# Patient Record
Sex: MALE | Race: WHITE | NOT HISPANIC OR LATINO | URBAN - METROPOLITAN AREA
[De-identification: names, ages, dates, MRNs, and addresses within clinical notes are randomized per-mention and may not be internally consistent; named-entity substitution may affect disease eponyms.]

---

## 2023-05-30 LAB
ALANINE AMINOTRANSFERASE (SGPT) (U/L) IN SER/PLAS: 28 U/L (ref 10–52)
ALBUMIN (G/DL) IN SER/PLAS: 4.4 G/DL (ref 3.4–5)
ALKALINE PHOSPHATASE (U/L) IN SER/PLAS: 53 U/L (ref 33–136)
ANION GAP IN SER/PLAS: 13 MMOL/L (ref 10–20)
ASPARTATE AMINOTRANSFERASE (SGOT) (U/L) IN SER/PLAS: 21 U/L (ref 9–39)
BILIRUBIN TOTAL (MG/DL) IN SER/PLAS: 1.7 MG/DL (ref 0–1.2)
CALCIUM (MG/DL) IN SER/PLAS: 9.6 MG/DL (ref 8.6–10.3)
CARBON DIOXIDE, TOTAL (MMOL/L) IN SER/PLAS: 29 MMOL/L (ref 21–32)
CHLORIDE (MMOL/L) IN SER/PLAS: 102 MMOL/L (ref 98–107)
CREATININE (MG/DL) IN SER/PLAS: 1.09 MG/DL (ref 0.5–1.3)
ERYTHROCYTE DISTRIBUTION WIDTH (RATIO) BY AUTOMATED COUNT: 12.6 % (ref 11.5–14.5)
ERYTHROCYTE MEAN CORPUSCULAR HEMOGLOBIN CONCENTRATION (G/DL) BY AUTOMATED: 33.2 G/DL (ref 32–36)
ERYTHROCYTE MEAN CORPUSCULAR VOLUME (FL) BY AUTOMATED COUNT: 86 FL (ref 80–100)
ERYTHROCYTES (10*6/UL) IN BLOOD BY AUTOMATED COUNT: 4.77 X10E12/L (ref 4.5–5.9)
GFR MALE: 77 ML/MIN/1.73M2
GLUCOSE (MG/DL) IN SER/PLAS: 86 MG/DL (ref 74–99)
HEMATOCRIT (%) IN BLOOD BY AUTOMATED COUNT: 41 % (ref 41–52)
HEMOGLOBIN (G/DL) IN BLOOD: 13.6 G/DL (ref 13.5–17.5)
LEUKOCYTES (10*3/UL) IN BLOOD BY AUTOMATED COUNT: 7 X10E9/L (ref 4.4–11.3)
PLATELETS (10*3/UL) IN BLOOD AUTOMATED COUNT: 278 X10E9/L (ref 150–450)
POTASSIUM (MMOL/L) IN SER/PLAS: 4.7 MMOL/L (ref 3.5–5.3)
PROTEIN TOTAL: 7.1 G/DL (ref 6.4–8.2)
SODIUM (MMOL/L) IN SER/PLAS: 139 MMOL/L (ref 136–145)
THYROTROPIN (MIU/L) IN SER/PLAS BY DETECTION LIMIT <= 0.05 MIU/L: 1.2 MIU/L (ref 0.44–3.98)
UREA NITROGEN (MG/DL) IN SER/PLAS: 21 MG/DL (ref 6–23)

## 2023-10-30 PROBLEM — R79.89 ELEVATED HOMOCYSTEINE: Status: ACTIVE | Noted: 2019-05-10

## 2023-10-30 PROBLEM — R26.89 BALANCE PROBLEM: Status: ACTIVE | Noted: 2023-10-30

## 2023-10-30 PROBLEM — E78.5 HYPERLIPIDEMIA: Status: ACTIVE | Noted: 2023-10-30

## 2023-10-30 PROBLEM — H93.A2 SUBJECTIVE PULSATILE TINNITUS OF LEFT EAR: Status: ACTIVE | Noted: 2023-10-30

## 2023-10-30 PROBLEM — G43.009 MIGRAINE WITHOUT AURA AND WITHOUT STATUS MIGRAINOSUS, NOT INTRACTABLE: Status: ACTIVE | Noted: 2021-06-25

## 2023-10-30 PROBLEM — N40.0 BPH (BENIGN PROSTATIC HYPERPLASIA): Status: ACTIVE | Noted: 2018-11-20

## 2023-10-30 PROBLEM — R73.01 IFG (IMPAIRED FASTING GLUCOSE): Status: ACTIVE | Noted: 2020-09-29

## 2023-10-30 PROBLEM — R31.29 MICROHEMATURIA: Status: ACTIVE | Noted: 2018-10-12

## 2023-10-30 PROBLEM — H93.19 TINNITUS: Status: ACTIVE | Noted: 2023-10-30

## 2023-10-30 PROBLEM — K21.9 GASTROESOPHAGEAL REFLUX DISEASE: Status: ACTIVE | Noted: 2023-10-12

## 2023-10-30 PROBLEM — R97.20 ELEVATED PSA: Status: ACTIVE | Noted: 2022-10-12

## 2023-10-30 PROBLEM — I77.72 ILIAC DISSECTION (MULTI): Status: ACTIVE | Noted: 2023-10-30

## 2023-10-30 PROBLEM — I72.0 CAROTID PSEUDOANEURYSM (CMS-HCC): Status: ACTIVE | Noted: 2023-10-30

## 2023-10-30 RX ORDER — ROSUVASTATIN CALCIUM 40 MG/1
40 TABLET, COATED ORAL NIGHTLY
COMMUNITY
Start: 2022-10-12

## 2023-10-30 RX ORDER — METOPROLOL SUCCINATE 50 MG/1
50 TABLET, EXTENDED RELEASE ORAL DAILY
COMMUNITY
Start: 2022-12-02

## 2023-10-30 RX ORDER — PRAVASTATIN SODIUM 40 MG/1
40 TABLET ORAL NIGHTLY
COMMUNITY
Start: 2012-01-23 | End: 2023-10-31 | Stop reason: ALTCHOICE

## 2023-10-30 RX ORDER — ALPRAZOLAM 0.25 MG/1
0.25 TABLET ORAL NIGHTLY PRN
COMMUNITY
Start: 2014-11-12 | End: 2023-10-31 | Stop reason: SDUPTHER

## 2023-10-30 RX ORDER — LISINOPRIL 20 MG/1
20 TABLET ORAL DAILY
COMMUNITY
Start: 2016-08-31 | End: 2023-10-31 | Stop reason: SDUPTHER

## 2023-10-30 RX ORDER — FLUOXETINE HYDROCHLORIDE 60 MG/1
60 TABLET, FILM COATED ORAL; ORAL
COMMUNITY
Start: 2020-02-17 | End: 2023-10-31 | Stop reason: ALTCHOICE

## 2023-10-30 RX ORDER — HYDROCHLOROTHIAZIDE 25 MG/1
25 TABLET ORAL DAILY
COMMUNITY
Start: 2013-04-17 | End: 2023-10-31 | Stop reason: ALTCHOICE

## 2023-10-30 RX ORDER — FOLIC ACID 1 MG/1
1 TABLET ORAL NIGHTLY
COMMUNITY
Start: 2023-02-23

## 2023-10-30 RX ORDER — ROSUVASTATIN CALCIUM 20 MG/1
20 TABLET, COATED ORAL
COMMUNITY
Start: 2020-10-02 | End: 2023-10-31 | Stop reason: ALTCHOICE

## 2023-10-30 RX ORDER — HYDROCHLOROTHIAZIDE 12.5 MG/1
12.5 TABLET ORAL DAILY
COMMUNITY
Start: 2012-01-23 | End: 2023-10-31 | Stop reason: ALTCHOICE

## 2023-10-30 RX ORDER — WARFARIN SODIUM 5 MG/1
5 TABLET ORAL
COMMUNITY
Start: 2012-01-23 | End: 2023-10-31 | Stop reason: ALTCHOICE

## 2023-10-30 RX ORDER — NAPROXEN SODIUM 220 MG/1
81 TABLET, FILM COATED ORAL DAILY
COMMUNITY
Start: 2012-01-23 | End: 2023-10-31 | Stop reason: ALTCHOICE

## 2023-10-30 RX ORDER — LANOLIN ALCOHOL/MO/W.PET/CERES
1000 CREAM (GRAM) TOPICAL DAILY
COMMUNITY
Start: 2022-09-29

## 2023-10-30 RX ORDER — PANTOPRAZOLE SODIUM 40 MG/1
1 TABLET, DELAYED RELEASE ORAL
COMMUNITY
Start: 2023-10-12

## 2023-10-30 RX ORDER — ALPRAZOLAM 0.5 MG/1
1 TABLET ORAL NIGHTLY
COMMUNITY
Start: 2020-02-17 | End: 2023-10-31 | Stop reason: ALTCHOICE

## 2023-10-30 RX ORDER — CLOPIDOGREL BISULFATE 75 MG/1
1 TABLET ORAL DAILY
COMMUNITY
Start: 2020-02-17

## 2023-10-30 RX ORDER — CHLORTHALIDONE 25 MG/1
1 TABLET ORAL DAILY
COMMUNITY
Start: 2020-02-17

## 2023-10-30 RX ORDER — AMLODIPINE BESYLATE 5 MG/1
5 TABLET ORAL
COMMUNITY
Start: 2022-03-28 | End: 2023-10-31 | Stop reason: SDUPTHER

## 2023-10-30 RX ORDER — LISINOPRIL 40 MG/1
1 TABLET ORAL NIGHTLY
COMMUNITY
Start: 2023-03-23

## 2023-10-30 RX ORDER — CETIRIZINE HYDROCHLORIDE 10 MG/1
1 TABLET ORAL DAILY
COMMUNITY
Start: 2020-02-17

## 2023-10-30 RX ORDER — SPIRONOLACTONE 25 MG/1
25 TABLET ORAL DAILY
COMMUNITY
Start: 2023-10-12

## 2023-10-30 RX ORDER — METOPROLOL SUCCINATE 25 MG/1
1 TABLET, EXTENDED RELEASE ORAL DAILY
COMMUNITY
Start: 2020-02-17 | End: 2023-10-31 | Stop reason: SDUPTHER

## 2023-10-30 RX ORDER — ALPHA LIPOIC ACID 200 MG
CAPSULE ORAL
COMMUNITY
Start: 2020-02-17 | End: 2023-10-31 | Stop reason: ALTCHOICE

## 2023-10-30 RX ORDER — FLUOXETINE HYDROCHLORIDE 20 MG/1
80 CAPSULE ORAL NIGHTLY
COMMUNITY
Start: 2022-09-29

## 2023-10-30 RX ORDER — CETIRIZINE HYDROCHLORIDE 5 MG/1
5 TABLET ORAL NIGHTLY
COMMUNITY
Start: 2022-09-29 | End: 2023-10-31 | Stop reason: SDUPTHER

## 2023-10-30 RX ORDER — ROSUVASTATIN CALCIUM 10 MG/1
1 TABLET, COATED ORAL NIGHTLY
COMMUNITY
Start: 2012-10-31 | End: 2023-10-31 | Stop reason: ALTCHOICE

## 2023-10-30 RX ORDER — AMLODIPINE BESYLATE 10 MG/1
10 TABLET ORAL DAILY
COMMUNITY
Start: 2022-12-02

## 2023-10-31 ENCOUNTER — HOSPITAL ENCOUNTER (OUTPATIENT)
Dept: VASCULAR MEDICINE | Facility: HOSPITAL | Age: 63
Discharge: HOME | End: 2023-10-31
Payer: COMMERCIAL

## 2023-10-31 ENCOUNTER — HOSPITAL ENCOUNTER (OUTPATIENT)
Dept: RADIOLOGY | Facility: HOSPITAL | Age: 63
Discharge: HOME | End: 2023-10-31
Payer: COMMERCIAL

## 2023-10-31 ENCOUNTER — OFFICE VISIT (OUTPATIENT)
Dept: CARDIOLOGY | Facility: HOSPITAL | Age: 63
End: 2023-10-31
Payer: COMMERCIAL

## 2023-10-31 VITALS
WEIGHT: 182.5 LBS | BODY MASS INDEX: 27.66 KG/M2 | HEART RATE: 58 BPM | SYSTOLIC BLOOD PRESSURE: 133 MMHG | HEIGHT: 68 IN | DIASTOLIC BLOOD PRESSURE: 84 MMHG | OXYGEN SATURATION: 96 %

## 2023-10-31 DIAGNOSIS — I77.72: ICD-10-CM

## 2023-10-31 DIAGNOSIS — Z86.79 PERSONAL HISTORY OF OTHER DISEASES OF THE CIRCULATORY SYSTEM: ICD-10-CM

## 2023-10-31 DIAGNOSIS — I72.0 ANEURYSM OF CAROTID ARTERY (CMS-HCC): ICD-10-CM

## 2023-10-31 DIAGNOSIS — I77.71 CAROTID DISSECTION, BILATERAL (MULTI): ICD-10-CM

## 2023-10-31 DIAGNOSIS — I72.0 CAROTID PSEUDOANEURYSM (CMS-HCC): ICD-10-CM

## 2023-10-31 DIAGNOSIS — E78.2 MIXED HYPERLIPIDEMIA: ICD-10-CM

## 2023-10-31 PROCEDURE — 99214 OFFICE O/P EST MOD 30 MIN: CPT | Performed by: INTERNAL MEDICINE

## 2023-10-31 PROCEDURE — 1036F TOBACCO NON-USER: CPT | Performed by: INTERNAL MEDICINE

## 2023-10-31 PROCEDURE — 70547 MR ANGIOGRAPHY NECK W/O DYE: CPT

## 2023-10-31 PROCEDURE — 3079F DIAST BP 80-89 MM HG: CPT | Performed by: INTERNAL MEDICINE

## 2023-10-31 PROCEDURE — 70544 MR ANGIOGRAPHY HEAD W/O DYE: CPT

## 2023-10-31 PROCEDURE — 70547 MR ANGIOGRAPHY NECK W/O DYE: CPT | Performed by: RADIOLOGY

## 2023-10-31 PROCEDURE — 93978 VASCULAR STUDY: CPT | Performed by: INTERNAL MEDICINE

## 2023-10-31 PROCEDURE — 70544 MR ANGIOGRAPHY HEAD W/O DYE: CPT | Performed by: RADIOLOGY

## 2023-10-31 PROCEDURE — 3074F SYST BP LT 130 MM HG: CPT | Performed by: INTERNAL MEDICINE

## 2023-10-31 PROCEDURE — 93978 VASCULAR STUDY: CPT

## 2023-10-31 NOTE — PATIENT INSTRUCTIONS
Good to see you today Mr. Sargent.  Continue medications as prescribed.  We will reach out with final report of today's testing.    See you back in 2 years if testing is stable.     Very truly yours,    Britany Harding MD  Co-Director, Vascular Center  [facility], [facility]   Ramon Hicks Master Clinician in Fibromuscular Dysplasia and Vascular Care  Professor of Medicine  [facility]

## 2023-10-31 NOTE — PROGRESS NOTES
"10/31/23    FMD/Arterial Dissection Program    Chief Complaint:   No chief complaint on file.     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 He is maintained on clopidogrel long-term. He also has hypertension and hyperlipidemia. 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 He had imaging of his aorta and visceral branches which was normal in ~ 2013 but he was found to have a \"new\" right EIA on brain to pelvis imaging in 9.2021 of uncertain age. He has a family history of Nagel syndrome. We did not do other genetic testing given no other vascular abnormalities (i.e., no aneurysms and thought low yield). In 2.2020, he had a new event with pulsatile tinnitus and scalp pain -- found to have new left ICA dissection with PSA. He saw Dr. Tim; we conservatively managed and this was improved on subsequent imaging.     Since I saw him last in May no known vascular events. He has stable pulsatile tinnitus and minimal headaches. He does have a mild headache today.  He is tolerating clopidogrel without bleeding. BP better with Dr. David's adjustments.    Exertional dyspnea with stair climbing and fatigue are better, though he sees his cardiologist soon.    Past Medical History:  He has a past medical history of Carotid pseudoaneurysm (CMS/HCC), Fatigue, History of carotid artery dissection, Iliac dissection (CMS/HCC), Mixed hyperlipidemia, Personal history of other diseases of the circulatory system, Personal history of other diseases of the circulatory system (09/01/2022), Personal history of other mental and behavioral disorders, and Subjective pulsatile tinnitus.    Past Surgical History:  He has a past surgical history that includes Other surgical history (02/17/2020); Other surgical history (02/17/2020); CT angio head w and wo IV contrast " "(02/17/2020); CT angio neck w and wo IV contrast (02/17/2020); CT angio neck w and wo IV contrast (06/08/2020); CT angio head w and wo IV contrast (06/08/2020); MR angio head wo IV contrast (12/14/2020); MR angio neck w and wo IV contrast (12/14/2020); MR angio head wo IV contrast (10/15/2022); CT angio head w and wo IV contrast (06/24/2021); CT angio neck w and wo IV contrast (06/24/2021); Cholecystectomy; and Hernia repair.      Social History:  He reports that he has never smoked. He has never used smokeless tobacco. No history on file for alcohol use and drug use.    Family History:  Family History   Problem Relation Name Age of Onset    Hypertension Mother      Hypertension Father      Nagel syndrome Other          Allergies:  Fish oil    Outpatient Medications:  Current Outpatient Medications   Medication Instructions    amLODIPine (NORVASC) 10 mg, oral, Daily    cetirizine (ZyrTEC) 10 mg tablet 1 tablet, oral, Daily    chlorthalidone (Hygroton) 25 mg tablet 1 tablet, oral, Daily    clopidogrel (Plavix) 75 mg tablet 1 tablet, oral, Daily    cyanocobalamin (VITAMIN B-12) 1,000 mcg, oral, Daily    FLUoxetine (PROZAC) 80 mg, oral, Nightly    folic acid (FOLVITE) 1 mg, oral, Nightly    lisinopril 40 mg tablet 1 tablet, oral, Nightly    metoprolol succinate XL (TOPROL-XL) 50 mg, oral, Daily    pantoprazole (ProtoNix) 40 mg EC tablet 1 tablet, oral, Daily before breakfast    rosuvastatin (CRESTOR) 40 mg, oral, Nightly    spironolactone (ALDACTONE) 25 mg, oral, Daily       Physical Exam:  Last Recorded Vitals:  Vitals:    10/31/23 1105 10/31/23 1106   BP: 126/81 133/84   BP Location: Left arm Left arm   Patient Position: Sitting Sitting   BP Cuff Size: Adult Adult   Pulse: 58    SpO2: 96%    Weight: 82.8 kg (182 lb 8 oz)    Height: 1.727 m (5' 8\")    He is in no distress   No Janel's  JVP not elevated, no cervical bruits  +S1, S2, RRR; no m/r/g  Clear lungs bilaterally  No pulse deficits, no femoral bruits     " "  Last Labs:  CBC -  Lab Results   Component Value Date    WBC 7.0 05/30/2023    HGB 13.6 05/30/2023    HCT 41.0 05/30/2023    MCV 86 05/30/2023     05/30/2023       CMP -  Lab Results   Component Value Date    CALCIUM 9.6 05/30/2023    PROT 7.1 05/30/2023    ALBUMIN 4.4 05/30/2023    AST 21 05/30/2023    ALT 28 05/30/2023    ALKPHOS 53 05/30/2023    BILITOT 1.7 (H) 05/30/2023       LIPID PANEL -   Lab Results   Component Value Date    CHOL 140 09/01/2022    TRIG 86 09/01/2022    HDL 47.4 09/01/2022    CHHDL 3.0 09/01/2022    LDLF 75 09/01/2022    VLDL 17 09/01/2022       RENAL FUNCTION PANEL -   Lab Results   Component Value Date    GLUCOSE 86 05/30/2023     05/30/2023    K 4.7 05/30/2023     05/30/2023    CO2 29 05/30/2023    ANIONGAP 13 05/30/2023    BUN 21 05/30/2023    CREATININE 1.09 05/30/2023    GFRMALE 77 05/30/2023    CALCIUM 9.6 05/30/2023    ALBUMIN 4.4 05/30/2023        I reviewed today's imaging    MRA head/neck without contrast  Persistent bilateral ICA PSAs, below right ICA PSA, area of minor \"beading\".  Await radiology read    Arterial duplex with stable right EIA/chronic dissection flap, no aneurysmal dilatation    Assessment/Plan   63 year-old man with bilateral cervical artery dissections dating back to 2011 and followed for many years. He had a new event diagnosed in February, 2020 when he presented with pulsatile tinnitus and scalp pain. The associated pseudoaneurysm has decreased over time. On prior CTA there is a beaded appearance of his ICAs which reflect prior dissection/healing in lower segments, not FMD. He had repeat below neck imaging last year that found right EIA dissection.     He is doing well overall. On my review of his MRA, I think findings are stable and I also think right EIA dissection flap is chronic stable. Chronology known, ? Interval event vs. related to prior angiography but not previously found.    If radiology agree findings are stable, will plan " repeat imaging 2 years and continue clopidogrel and BP control and lipid lowering Rx.   I am pleased he is doing well.    Britany Harding MD  Co-Director, Vascular Center  [facility], [facility]   Ramon Jacobs Family Master Clinician in Fibromuscular Dysplasia and Vascular Care  Professor of Medicine  [facility]

## 2023-10-31 NOTE — Clinical Note
"October 31, 2023       No Recipients    Patient: Helder Sargent   YOB: 1960   Date of Visit: 10/31/2023       Dear Dr. Li Recipients:    Thank you for referring Helder Sargent to me for evaluation. Below are my notes for this consultation.  If you have questions, please do not hesitate to call me. I look forward to following your patient along with you.       Sincerely,     Britany Harding MD      CC:   No Recipients  ______________________________________________________________________________________    10/31/23    FMD/Arterial Dissection Program    Chief Complaint:   No chief complaint on file.     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 He is maintained on clopidogrel long-term. He also has hypertension and hyperlipidemia. 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 He had imaging of his aorta and visceral branches which was normal in ~ 2013 but he was found to have a \"new\" right EIA on brain to pelvis imaging in 9.2021 of uncertain age. He has a family history of Nagel syndrome. We did not do other genetic testing given no other vascular abnormalities (i.e., no aneurysms and thought low yield). In 2.2020, he had a new event with pulsatile tinnitus and scalp pain -- found to have new left ICA dissection with PSA. He saw Dr. Tim; we conservatively managed and this was improved on subsequent imaging.     Since I saw him last in May no known vascular events. He has stable pulsatile tinnitus and minimal headaches. He does have a mild headache today.  He is tolerating clopidogrel without bleeding. BP better with Dr. David's adjustments.    Exertional dyspnea with stair climbing and fatigue are better, though he sees his cardiologist soon.    Past Medical History:  He has a past medical history of Carotid " pseudoaneurysm (CMS/HCC), Fatigue, History of carotid artery dissection, Iliac dissection (CMS/HCC), Mixed hyperlipidemia, Personal history of other diseases of the circulatory system, Personal history of other diseases of the circulatory system (09/01/2022), Personal history of other mental and behavioral disorders, and Subjective pulsatile tinnitus.    Past Surgical History:  He has a past surgical history that includes Other surgical history (02/17/2020); Other surgical history (02/17/2020); CT angio head w and wo IV contrast (02/17/2020); CT angio neck w and wo IV contrast (02/17/2020); CT angio neck w and wo IV contrast (06/08/2020); CT angio head w and wo IV contrast (06/08/2020); MR angio head wo IV contrast (12/14/2020); MR angio neck w and wo IV contrast (12/14/2020); MR angio head wo IV contrast (10/15/2022); CT angio head w and wo IV contrast (06/24/2021); CT angio neck w and wo IV contrast (06/24/2021); Cholecystectomy; and Hernia repair.      Social History:  He reports that he has never smoked. He has never used smokeless tobacco. No history on file for alcohol use and drug use.    Family History:  Family History   Problem Relation Name Age of Onset    Hypertension Mother      Hypertension Father      Nagel syndrome Other          Allergies:  Fish oil    Outpatient Medications:  Current Outpatient Medications   Medication Instructions    amLODIPine (NORVASC) 10 mg, oral, Daily    cetirizine (ZyrTEC) 10 mg tablet 1 tablet, oral, Daily    chlorthalidone (Hygroton) 25 mg tablet 1 tablet, oral, Daily    clopidogrel (Plavix) 75 mg tablet 1 tablet, oral, Daily    cyanocobalamin (VITAMIN B-12) 1,000 mcg, oral, Daily    FLUoxetine (PROZAC) 80 mg, oral, Nightly    folic acid (FOLVITE) 1 mg, oral, Nightly    lisinopril 40 mg tablet 1 tablet, oral, Nightly    metoprolol succinate XL (TOPROL-XL) 50 mg, oral, Daily    pantoprazole (ProtoNix) 40 mg EC tablet 1 tablet, oral, Daily before breakfast    rosuvastatin  "(CRESTOR) 40 mg, oral, Nightly    spironolactone (ALDACTONE) 25 mg, oral, Daily       Physical Exam:  Last Recorded Vitals:  Vitals:    10/31/23 1105 10/31/23 1106   BP: 126/81 133/84   BP Location: Left arm Left arm   Patient Position: Sitting Sitting   BP Cuff Size: Adult Adult   Pulse: 58    SpO2: 96%    Weight: 82.8 kg (182 lb 8 oz)    Height: 1.727 m (5' 8\")    He is in no distress   No Janel's  JVP not elevated, no cervical bruits  +S1, S2, RRR; no m/r/g  Clear lungs bilaterally  No pulse deficits, no femoral bruits       Last Labs:  CBC -  Lab Results   Component Value Date    WBC 7.0 05/30/2023    HGB 13.6 05/30/2023    HCT 41.0 05/30/2023    MCV 86 05/30/2023     05/30/2023       CMP -  Lab Results   Component Value Date    CALCIUM 9.6 05/30/2023    PROT 7.1 05/30/2023    ALBUMIN 4.4 05/30/2023    AST 21 05/30/2023    ALT 28 05/30/2023    ALKPHOS 53 05/30/2023    BILITOT 1.7 (H) 05/30/2023       LIPID PANEL -   Lab Results   Component Value Date    CHOL 140 09/01/2022    TRIG 86 09/01/2022    HDL 47.4 09/01/2022    CHHDL 3.0 09/01/2022    LDLF 75 09/01/2022    VLDL 17 09/01/2022       RENAL FUNCTION PANEL -   Lab Results   Component Value Date    GLUCOSE 86 05/30/2023     05/30/2023    K 4.7 05/30/2023     05/30/2023    CO2 29 05/30/2023    ANIONGAP 13 05/30/2023    BUN 21 05/30/2023    CREATININE 1.09 05/30/2023    GFRMALE 77 05/30/2023    CALCIUM 9.6 05/30/2023    ALBUMIN 4.4 05/30/2023        I reviewed today's imaging    MRA head/neck without contrast  Persistent bilateral ICA PSAs, below right ICA PSA, area of minor \"beading\".  Await radiology read    Arterial duplex with stable right EIA/chronic dissection flap, no aneurysmal dilatation    Assessment/Plan  63 year-old man with bilateral cervical artery dissections dating back to 2011 and followed for many years. He had a new event diagnosed in February, 2020 when he presented with pulsatile tinnitus and scalp pain. The associated " pseudoaneurysm has decreased over time. On prior CTA there is a beaded appearance of his ICAs which reflect prior dissection/healing in lower segments, not FMD. He had repeat below neck imaging last year that found right EIA dissection.     He is doing well overall. On my review of his MRA, I think findings are stable and I also think right EIA dissection flap is chronic stable. Chronology known, ? Interval event vs. related to prior angiography but not previously found.    If radiology agree findings are stable, will plan repeat imaging 2 years and continue clopidogrel and BP control and lipid lowering Rx.   I am pleased he is doing well.    Britany Harding MD  Co-Director, Vascular Center  [facility] [place], [facility]   Ramon Hicks Master Clinician in Fibromuscular Dysplasia and Vascular Care  Professor of Medicine  [facility]